# Patient Record
Sex: FEMALE | Race: WHITE
[De-identification: names, ages, dates, MRNs, and addresses within clinical notes are randomized per-mention and may not be internally consistent; named-entity substitution may affect disease eponyms.]

---

## 2022-06-28 ENCOUNTER — HOSPITAL ENCOUNTER (OUTPATIENT)
Dept: HOSPITAL 75 - RAD | Age: 42
End: 2022-06-28
Attending: OBSTETRICS & GYNECOLOGY
Payer: COMMERCIAL

## 2022-06-28 DIAGNOSIS — Z98.84: Primary | ICD-10-CM

## 2022-06-28 PROCEDURE — 77080 DXA BONE DENSITY AXIAL: CPT

## 2022-06-28 NOTE — DIAGNOSTIC IMAGING REPORT
INDICATION: Workup for bariatric surgical procedure



COMPARISON: None



FINDINGS:



AP Spine L1-L4:  

[BMD (g/cm2): 1.370] [T-Score: 1.4] [Z-Score: 0.3]

[BMD Previous: NA] [BMD % Change: NA]



LT Hip Neck:       

[BMD (g/cm2): 1.164] [T-Score: 0.9] [Z-Score: 0.6]



LT Hip Total:       

[BMD (g/cm2):1.207] [T-Score:1.6] [Z-Score: 1.0]

[BMD Previous: NA] [BMD % Change: NA]



RT Hip Neck:      

[BMD (g/cm2):1.140] [T-Score:0.7] [Z-Score:0.5]



RT Hip Total:      

[BMD (g/cm2):1.180] [T-score:1.4] [Z-Score:0.8]

[BMD Previous:NA] [BMD % Change:NA]



*Indicates significant change from prior examination based on 95%

confidence level.



World Health Organization criteria for BMD interpretation

classify patients as Normal (T-score at or above -1.0),

Osteopenic (T-score between -1.0 and -2.5) or Osteoporotic

(T-score at or below -2.5).



LIMITATIONS AND MODIFICATION:  None.



FRACTURE RISK (FRAX SCORE):

The ten year probability of (%): 

Major Osteoporotic Fracture: [NA]

Hip Fracture: [NA]



IMPRESSION:

1. Normal bone mineral density. 

2. Baseline examination.

3. See below [employer] guidelines on when

to potentially initiate pharmacologic therapy. 



Based on the National [employer]oporosis [employer] Guidelines,

pharmacologic treatment should be initiated in any of the

following, unless clinical conditions suggest otherwise:



*  Any patient with prior fragility fracture of the hip or

vertebrae. A spine fracture indicates 5X risk for subsequent

spine fracture and 2X risk for subsequent hip fracture.



*  Osteoporosis (T-score <-2.5).



*  Postmenopausal women and men age 50 and older with low bone

mass/osteopenia (T-score between -1.0 and -2.5) by DXA and

10-year major osteoporotic fracture greater than 20% or a 10-year

probability of hip fracture greater than 3%. These fracture risks

are supplied above in the FRAX score, if applicable.



*  Clinician judgement and/or patient preferences may indicate

treatment for people with 10-year fracture probabilities above or

below these levels.



Dictated by: 



  Dictated on workstation # [id number]